# Patient Record
Sex: FEMALE
[De-identification: names, ages, dates, MRNs, and addresses within clinical notes are randomized per-mention and may not be internally consistent; named-entity substitution may affect disease eponyms.]

---

## 2017-01-25 PROCEDURE — 87252 VIRUS INOCULATION TISSUE: CPT | Performed by: INTERNAL MEDICINE

## 2017-01-25 PROCEDURE — 87081 CULTURE SCREEN ONLY: CPT | Performed by: INTERNAL MEDICINE

## 2017-12-12 ENCOUNTER — CHARTING TRANS (OUTPATIENT)
Dept: OTHER | Age: 32
End: 2017-12-12

## 2017-12-12 ENCOUNTER — LAB SERVICES (OUTPATIENT)
Dept: OTHER | Age: 32
End: 2017-12-12

## 2017-12-12 LAB — RAPID STREP GROUP A: NORMAL

## 2018-04-03 ENCOUNTER — TELEPHONE (OUTPATIENT)
Dept: OBGYN UNIT | Facility: HOSPITAL | Age: 33
End: 2018-04-03

## 2018-04-04 ENCOUNTER — TELEPHONE (OUTPATIENT)
Dept: OBGYN UNIT | Facility: HOSPITAL | Age: 33
End: 2018-04-04

## 2018-04-09 NOTE — H&P
[facility]    PATIENT'S NAME: Julio BROWN   ATTENDING PHYSICIAN: Brigida Morales M.D.    PATIENT ACCOUNT#:   [de-identified]    LOCATION:    MEDICAL RECORD #:   [id number]       YOB: 1985  ADMISSION DATE:       04/10/2018    HISTORY AND P , arthroscopic ACL repair in ,  in  and also in . Her first  was for breech and her second  she attempted , pushed for 2 hours, and was told she had a small pelvis.     MEDICATIONS:  She is currently on prenat 12:19:35  t: 04/09/2018 12:47:16  [facility] [id number]  /

## 2018-04-10 ENCOUNTER — HOSPITAL ENCOUNTER (INPATIENT)
Facility: HOSPITAL | Age: 33
LOS: 3 days | Discharge: HOME | End: 2018-04-13
Attending: OBSTETRICS & GYNECOLOGY | Admitting: OBSTETRICS & GYNECOLOGY
Payer: COMMERCIAL

## 2018-04-10 ENCOUNTER — ANESTHESIA (OUTPATIENT)
Dept: OBGYN UNIT | Facility: HOSPITAL | Age: 33
End: 2018-04-10

## 2018-04-10 ENCOUNTER — ANESTHESIA EVENT (OUTPATIENT)
Dept: OBGYN UNIT | Facility: HOSPITAL | Age: 33
End: 2018-04-10

## 2018-04-10 ENCOUNTER — SURGERY (OUTPATIENT)
Age: 33
End: 2018-04-10

## 2018-04-10 PROBLEM — Z34.90 PREGNANCY: Status: ACTIVE | Noted: 2018-04-10

## 2018-04-10 PROBLEM — Z34.90 PREGNANCY (HCC): Status: ACTIVE | Noted: 2018-04-10

## 2018-04-10 PROBLEM — Z98.891 STATUS POST CESAREAN SECTION: Status: ACTIVE | Noted: 2018-04-10

## 2018-04-10 PROCEDURE — 86850 RBC ANTIBODY SCREEN: CPT | Performed by: OBSTETRICS & GYNECOLOGY

## 2018-04-10 PROCEDURE — 81002 URINALYSIS NONAUTO W/O SCOPE: CPT

## 2018-04-10 PROCEDURE — 86900 BLOOD TYPING SEROLOGIC ABO: CPT | Performed by: OBSTETRICS & GYNECOLOGY

## 2018-04-10 PROCEDURE — 85025 COMPLETE CBC W/AUTO DIFF WBC: CPT | Performed by: OBSTETRICS & GYNECOLOGY

## 2018-04-10 PROCEDURE — 86901 BLOOD TYPING SEROLOGIC RH(D): CPT | Performed by: OBSTETRICS & GYNECOLOGY

## 2018-04-10 PROCEDURE — 86780 TREPONEMA PALLIDUM: CPT | Performed by: OBSTETRICS & GYNECOLOGY

## 2018-04-10 RX ORDER — ONDANSETRON 2 MG/ML
4 INJECTION INTRAMUSCULAR; INTRAVENOUS EVERY 6 HOURS PRN
Status: DISCONTINUED | OUTPATIENT
Start: 2018-04-10 | End: 2018-04-10

## 2018-04-10 RX ORDER — NALOXONE HYDROCHLORIDE 0.4 MG/ML
0.08 INJECTION, SOLUTION INTRAMUSCULAR; INTRAVENOUS; SUBCUTANEOUS
Status: DISCONTINUED | OUTPATIENT
Start: 2018-04-10 | End: 2018-04-10

## 2018-04-10 RX ORDER — ONDANSETRON 2 MG/ML
4 INJECTION INTRAMUSCULAR; INTRAVENOUS ONCE AS NEEDED
Status: DISCONTINUED | OUTPATIENT
Start: 2018-04-10 | End: 2018-04-10 | Stop reason: HOSPADM

## 2018-04-10 RX ORDER — SODIUM CHLORIDE 9 MG/ML
100 INJECTION, SOLUTION INTRAVENOUS ONCE
Status: DISCONTINUED | OUTPATIENT
Start: 2018-04-10 | End: 2018-04-10

## 2018-04-10 RX ORDER — MORPHINE SULFATE 4 MG/ML
2 INJECTION, SOLUTION INTRAMUSCULAR; INTRAVENOUS EVERY 5 MIN PRN
Status: DISCONTINUED | OUTPATIENT
Start: 2018-04-10 | End: 2018-04-10 | Stop reason: HOSPADM

## 2018-04-10 RX ORDER — BISACODYL 10 MG
10 SUPPOSITORY, RECTAL RECTAL
Status: DISCONTINUED | OUTPATIENT
Start: 2018-04-10 | End: 2018-04-13

## 2018-04-10 RX ORDER — MORPHINE SULFATE 0.5 MG/ML
0.2 INJECTION, SOLUTION EPIDURAL; INTRATHECAL; INTRAVENOUS ONCE
Status: DISCONTINUED | OUTPATIENT
Start: 2018-04-10 | End: 2018-04-10

## 2018-04-10 RX ORDER — DEXTROSE, SODIUM CHLORIDE, SODIUM LACTATE, POTASSIUM CHLORIDE, AND CALCIUM CHLORIDE 5; .6; .31; .03; .02 G/100ML; G/100ML; G/100ML; G/100ML; G/100ML
INJECTION, SOLUTION INTRAVENOUS CONTINUOUS
Status: DISCONTINUED | OUTPATIENT
Start: 2018-04-10 | End: 2018-04-13

## 2018-04-10 RX ORDER — NALBUPHINE HCL 10 MG/ML
2.5 AMPUL (ML) INJECTION EVERY 4 HOURS PRN
Status: DISCONTINUED | OUTPATIENT
Start: 2018-04-10 | End: 2018-04-10

## 2018-04-10 RX ORDER — GENTAMICIN SULFATE 40 MG/ML
5 INJECTION, SOLUTION INTRAMUSCULAR; INTRAVENOUS ONCE
Status: COMPLETED | OUTPATIENT
Start: 2018-04-10 | End: 2018-04-10

## 2018-04-10 RX ORDER — DIPHENHYDRAMINE HYDROCHLORIDE 50 MG/ML
25 INJECTION INTRAMUSCULAR; INTRAVENOUS ONCE AS NEEDED
Status: DISCONTINUED | OUTPATIENT
Start: 2018-04-10 | End: 2018-04-10 | Stop reason: HOSPADM

## 2018-04-10 RX ORDER — KETOROLAC TROMETHAMINE 30 MG/ML
30 INJECTION, SOLUTION INTRAMUSCULAR; INTRAVENOUS ONCE AS NEEDED
Status: DISCONTINUED | OUTPATIENT
Start: 2018-04-10 | End: 2018-04-10 | Stop reason: HOSPADM

## 2018-04-10 RX ORDER — ZOLPIDEM TARTRATE 5 MG/1
5 TABLET ORAL NIGHTLY PRN
Status: DISCONTINUED | OUTPATIENT
Start: 2018-04-10 | End: 2018-04-13

## 2018-04-10 RX ORDER — HYDROCODONE BITARTRATE AND ACETAMINOPHEN 10; 325 MG/1; MG/1
1 TABLET ORAL EVERY 4 HOURS PRN
Status: DISCONTINUED | OUTPATIENT
Start: 2018-04-10 | End: 2018-04-13

## 2018-04-10 RX ORDER — NALBUPHINE HCL 10 MG/ML
2.5 AMPUL (ML) INJECTION
Status: DISCONTINUED | OUTPATIENT
Start: 2018-04-10 | End: 2018-04-10 | Stop reason: HOSPADM

## 2018-04-10 RX ORDER — HYDROCODONE BITARTRATE AND ACETAMINOPHEN 5; 325 MG/1; MG/1
1 TABLET ORAL EVERY 4 HOURS PRN
Status: DISCONTINUED | OUTPATIENT
Start: 2018-04-10 | End: 2018-04-13

## 2018-04-10 RX ORDER — DIPHENHYDRAMINE HYDROCHLORIDE 50 MG/ML
12.5 INJECTION INTRAMUSCULAR; INTRAVENOUS EVERY 4 HOURS PRN
Status: DISCONTINUED | OUTPATIENT
Start: 2018-04-10 | End: 2018-04-10

## 2018-04-10 RX ORDER — NALOXONE HYDROCHLORIDE 0.4 MG/ML
0.08 INJECTION, SOLUTION INTRAMUSCULAR; INTRAVENOUS; SUBCUTANEOUS
Status: ACTIVE | OUTPATIENT
Start: 2018-04-10 | End: 2018-04-11

## 2018-04-10 RX ORDER — DIPHENHYDRAMINE HYDROCHLORIDE 50 MG/ML
12.5 INJECTION INTRAMUSCULAR; INTRAVENOUS EVERY 4 HOURS PRN
Status: DISCONTINUED | OUTPATIENT
Start: 2018-04-10 | End: 2018-04-13

## 2018-04-10 RX ORDER — NALBUPHINE HCL 10 MG/ML
2.5 AMPUL (ML) INJECTION EVERY 4 HOURS PRN
Status: DISCONTINUED | OUTPATIENT
Start: 2018-04-10 | End: 2018-04-13

## 2018-04-10 RX ORDER — KETOROLAC TROMETHAMINE 30 MG/ML
INJECTION, SOLUTION INTRAMUSCULAR; INTRAVENOUS
Status: COMPLETED
Start: 2018-04-10 | End: 2018-04-10

## 2018-04-10 RX ORDER — MORPHINE SULFATE 4 MG/ML
2 INJECTION, SOLUTION INTRAMUSCULAR; INTRAVENOUS EVERY 2 HOUR PRN
Status: DISPENSED | OUTPATIENT
Start: 2018-04-10 | End: 2018-04-11

## 2018-04-10 RX ORDER — CLINDAMYCIN PHOSPHATE 900 MG/50ML
900 INJECTION INTRAVENOUS ONCE
Status: COMPLETED | OUTPATIENT
Start: 2018-04-10 | End: 2018-04-10

## 2018-04-10 RX ORDER — DIPHENHYDRAMINE HCL 25 MG
25 CAPSULE ORAL EVERY 4 HOURS PRN
Status: DISCONTINUED | OUTPATIENT
Start: 2018-04-10 | End: 2018-04-13

## 2018-04-10 RX ORDER — TRISODIUM CITRATE DIHYDRATE AND CITRIC ACID MONOHYDRATE 500; 334 MG/5ML; MG/5ML
30 SOLUTION ORAL ONCE
Status: COMPLETED | OUTPATIENT
Start: 2018-04-10 | End: 2018-04-10

## 2018-04-10 RX ORDER — SIMETHICONE 80 MG
80 TABLET,CHEWABLE ORAL 3 TIMES DAILY PRN
Status: DISCONTINUED | OUTPATIENT
Start: 2018-04-10 | End: 2018-04-13

## 2018-04-10 RX ORDER — IBUPROFEN 600 MG/1
600 TABLET ORAL EVERY 6 HOURS SCHEDULED
Status: DISCONTINUED | OUTPATIENT
Start: 2018-04-11 | End: 2018-04-13

## 2018-04-10 RX ORDER — SODIUM CHLORIDE, SODIUM LACTATE, POTASSIUM CHLORIDE, CALCIUM CHLORIDE 600; 310; 30; 20 MG/100ML; MG/100ML; MG/100ML; MG/100ML
INJECTION, SOLUTION INTRAVENOUS CONTINUOUS
Status: DISCONTINUED | OUTPATIENT
Start: 2018-04-10 | End: 2018-04-13

## 2018-04-10 RX ORDER — DIPHENHYDRAMINE HCL 25 MG
25 CAPSULE ORAL EVERY 4 HOURS PRN
Status: DISCONTINUED | OUTPATIENT
Start: 2018-04-10 | End: 2018-04-10

## 2018-04-10 RX ORDER — MORPHINE SULFATE 0.5 MG/ML
0.2 INJECTION, SOLUTION EPIDURAL; INTRATHECAL; INTRAVENOUS ONCE
Status: DISCONTINUED | OUTPATIENT
Start: 2018-04-10 | End: 2018-04-13

## 2018-04-10 RX ORDER — METOCLOPRAMIDE HYDROCHLORIDE 5 MG/ML
10 INJECTION INTRAMUSCULAR; INTRAVENOUS EVERY 6 HOURS PRN
Status: DISCONTINUED | OUTPATIENT
Start: 2018-04-10 | End: 2018-04-13

## 2018-04-10 RX ORDER — KETOROLAC TROMETHAMINE 30 MG/ML
30 INJECTION, SOLUTION INTRAMUSCULAR; INTRAVENOUS EVERY 6 HOURS PRN
Status: DISPENSED | OUTPATIENT
Start: 2018-04-10 | End: 2018-04-11

## 2018-04-10 RX ORDER — ONDANSETRON 2 MG/ML
4 INJECTION INTRAMUSCULAR; INTRAVENOUS EVERY 6 HOURS PRN
Status: DISCONTINUED | OUTPATIENT
Start: 2018-04-10 | End: 2018-04-13

## 2018-04-10 RX ORDER — DOCUSATE SODIUM 100 MG/1
100 CAPSULE, LIQUID FILLED ORAL
Status: DISCONTINUED | OUTPATIENT
Start: 2018-04-11 | End: 2018-04-13

## 2018-04-10 NOTE — BRIEF OP NOTE
Pre-Operative Diagnosis: 39.0 IUP Repeat  Section     Post-Operative Diagnosis: Same      Procedure Performed: ltcs  Procedure(s):      Surgeon(s) and Role:     * Evelio Gilman MD, MD - Primary     * Cathy Burrell MD - Assisting Surgeon    As

## 2018-04-10 NOTE — PROGRESS NOTES
Pt is a 28year old female [facility]. Patient presents with:  Scheduled      Pt is [id number] 39w0d intra-uterine pregnancy. History obtained, consents signed. Oriented to room, staff, and plan of care.

## 2018-04-10 NOTE — ANESTHESIA PREPROCEDURE EVALUATION
PRE-OP EVALUATION    Patient Name: Pilar Mandujano    Pre-op Diagnosis: * No pre-op diagnosis entered *    Procedure(s):      Surgeon(s) and Role:     Steven Martinez MD, MD - Primary    Pre-op vitals reviewed.   Temp: 98.4 °F (36.9 °C)  Pulse: 92  Resp: RBC 4.36 04/10/2018   HGB 12.9 04/10/2018   HCT 36.4 04/10/2018   MCV 83.5 04/10/2018   MCH 29.6 04/10/2018   MCHC 35.4 04/10/2018   RDW 12.9 04/10/2018   .0 04/10/2018               Airway      Mallampati: II  Mouth opening: >3 FB  TM distance: >

## 2018-04-10 NOTE — OPERATIVE REPORT
[facility]    PATIENT'S NAME: Matty Holguin KEVIN   ATTENDING PHYSICIAN: Purnima Casas M.D. OPERATING PHYSICIAN: Purnima Casas M.D.    PATIENT ACCOUNT#:   [de-identified]    LOCATION:  [address] [place] [address]  MEDICAL RECORD #:   [id number]       DATE OF BIRTH:  [birth year] superiorly, and then the muscle was opened in the midline with sharp dissection and then blunt dissection. The perineum was then also entered and a bladder flap was created using sharp dissection. There were minor adhesions from her previous surgery.   A cauterized. Then, a 2-0 plain suture was used to reapproximate that subcutaneous space and the skin was closed with staples. Estimated blood loss 800 mL. IV fluids in 2400 mL. Urine output 325 mL. All sponge, lap, and instrument counts were correct.

## 2018-04-10 NOTE — ANESTHESIA POSTPROCEDURE EVALUATION
[address] Patient Status:  Inpatient   Age/Gender 28year old female MRN [id number]   Location 1818 [facility] Attending Jose Suarez MD, MD   Hosp Day # 0 PCP No primary care provider on file.        Anesthesia P

## 2018-04-10 NOTE — PLAN OF CARE
BIRTH - VAGINAL/ SECTION    • Fetal and maternal status remain reassuring during the birth process Completed        Patient/Family Goals    • Patient/Family Long Term Goal Completed    • Patient/Family Short Term Goal Completed

## 2018-04-11 PROCEDURE — 85025 COMPLETE CBC W/AUTO DIFF WBC: CPT | Performed by: OBSTETRICS & GYNECOLOGY

## 2018-04-11 NOTE — PLAN OF CARE
GENITOURINARY - ADULT    • Absence of urinary retention Completed          GASTROINTESTINAL - ADULT    • Minimal or absence of nausea and vomiting Progressing    • Maintains or returns to baseline bowel function Progressing        POSTPARTUM    • Long Term

## 2018-04-11 NOTE — PROGRESS NOTES
Anesthesiology         Pt is POD#1 s/p C/S with neruaxial Duramoprh for postop analgesia. Pt denies any HA, backache, N/V or pruritis. Pt states that pain is well controlled. No evidence of anesthetic related complications.          Thank you for brittany

## 2018-04-11 NOTE — PROGRESS NOTES
Patient resting quietly. Holding infant. Urinated. Toerated gen diet. Ambulated. Pain relieved with pain meds.      04/11/18  0749   BP: 107/64   Pulse: 96   Resp: 20   Temp: 98.6 °F (37 °C)     I/O = -900      Lab Results  Component Value Date   WBC 9.9 04

## 2018-04-11 NOTE — PLAN OF CARE
ANXIETY    • Will report anxiety at manageable levels Completed        PAIN - ADULT    • Verbalizes/displays adequate comfort level or patient's stated pain goal Completed        SAFETY ADULT - FALL    • Free from fall injury Completed          GASTROINTES

## 2018-04-12 NOTE — PROGRESS NOTES
POD 2    s: doing well, bleeding ok, pain well managed with po meds, breastfeeding  O: /52 (BP Location: Right arm)   Pulse 81   Temp 98.5 °F (36.9 °C) (Oral)   Resp 16   Ht 5' 4\" (1.626 m)   Wt 200 lb (90.7 kg)   LMP 07/11/2017   SpO2 100%   Breast

## 2018-04-13 VITALS
HEART RATE: 76 BPM | SYSTOLIC BLOOD PRESSURE: 112 MMHG | RESPIRATION RATE: 18 BRPM | WEIGHT: 200 LBS | OXYGEN SATURATION: 100 % | BODY MASS INDEX: 34.15 KG/M2 | HEIGHT: 64 IN | TEMPERATURE: 98 F | DIASTOLIC BLOOD PRESSURE: 69 MMHG

## 2018-04-13 RX ORDER — IBUPROFEN 600 MG/1
600 TABLET ORAL EVERY 6 HOURS SCHEDULED
Qty: 40 TABLET | Refills: 1 | Status: ON HOLD | OUTPATIENT
Start: 2018-04-13 | End: 2021-11-09

## 2018-04-13 RX ORDER — HYDROCODONE BITARTRATE AND ACETAMINOPHEN 5; 325 MG/1; MG/1
1 TABLET ORAL EVERY 6 HOURS PRN
Qty: 30 TABLET | Refills: 0 | Status: ON HOLD | OUTPATIENT
Start: 2018-04-13 | End: 2021-11-09

## 2018-04-13 NOTE — PLAN OF CARE
GASTROINTESTINAL - ADULT    • Maintains or returns to baseline bowel function Completed        POSTPARTUM    • Long Term Goal:Experiences normal postpartum course Completed    • Optimize infant feeding at the breast Completed    • Appropriate maternal - ne

## 2018-04-13 NOTE — DISCHARGE SUMMARY
[facility]  Discharge Summary    [facility] Patient Status:  Inpatient    1985 MRN [id number]   [facility] 1SW-J Attending Leia Carrillo MD, MD   Hosp Day # 3 PCP No primary care provider on file.      Date of Admission:

## 2018-04-13 NOTE — PROGRESS NOTES
Discharge instructions given to mom. Mom stated undestanding with regards to self care.  Baby care and follow-up  appointments

## 2018-04-13 NOTE — PROGRESS NOTES
Postop Day 3    Pt without complaints. Pain under control with meds, + flatus, +BM, Minimal VB    Temp: 98.3 °F (36.8 °C)  Pulse: 76  Resp: 18  BP: 112/69  Abdomen -  Soft, NT, ND, fundus firm below umbilicus, incision C/D/I.  Staples +  Extremities - Trace

## 2018-04-17 ENCOUNTER — TELEPHONE (OUTPATIENT)
Dept: OBGYN UNIT | Facility: HOSPITAL | Age: 33
End: 2018-04-17

## 2018-11-02 VITALS
DIASTOLIC BLOOD PRESSURE: 68 MMHG | OXYGEN SATURATION: 98 % | RESPIRATION RATE: 18 BRPM | HEIGHT: 64 IN | WEIGHT: 175 LBS | SYSTOLIC BLOOD PRESSURE: 110 MMHG | HEART RATE: 72 BPM | TEMPERATURE: 98.2 F | BODY MASS INDEX: 29.88 KG/M2

## 2021-07-02 NOTE — PROGRESS NOTES
[facility] Consultation    Dear Dr. Sabina Barger,    Thank you for requesting ultrasound evaluation and maternal fetal medicine consultation on your patient Danielle Osuna.   As you are aware she is a 39year old female with a [place] Speak She indicated that the status of her maternal aunt is unknown. She indicated that the status of her paternal aunt is unknown.       Medications:   Current Outpatient Medications:   •  Cholecalciferol (VITAMIN D) 50 MCG (2000 UT) Oral Tab, Take by mouth., Zulma Mason during pregnancy is associated with numerous maternal and  risks.   It is not clear whether obesity is a direct cause of adverse pregnancy outcome or whether the association between obesity and adverse pregnancy outcome is due to factors such as di advised. We discussed the current recommendations for limited gestational weight gain in pregnancy for overweight and obese women. The [place] of Medicine currently recommends that women keep gestational weight gain to between 8-18 lbs.   We discussed placentation. The risk of placenta previa after 1 and after ? 3  deliveries was 10/1000 and , respectively.  The risk of placenta accreta in women with placenta previa and no  deliveries was 3 to 4 percent and was and 50 to 67 percent older can expect to experience two to three fold higher rates of hospitalization,  delivery, and pregnancy-related complications when compared to their younger counterparts.   The two most common medical problems complicating these  pregnanccies are offspring of older women. These abnormalities are structural and unrelated to aneuploidy, thus they would not be detected by karyotype analysis.   For these reasons a complete, detailed ultrasound (level II) is advised even if the fetus has a normal Gabi pregnancy  · Advanced maternal age  · History of  x 3    RECOMMENDATIONS:  · Continue care with Dr. Mar Singh  · Weekly NST at 36 weeks  · Growth and BPP ultrasound at 32 weeks    Thank you for allowing me to participate in the care of your patient

## 2021-07-07 ENCOUNTER — OFFICE VISIT (OUTPATIENT)
Dept: PERINATAL CARE | Facility: HOSPITAL | Age: 36
End: 2021-07-07
Attending: OBSTETRICS & GYNECOLOGY
Payer: COMMERCIAL

## 2021-07-07 VITALS
SYSTOLIC BLOOD PRESSURE: 112 MMHG | HEART RATE: 73 BPM | BODY MASS INDEX: 34 KG/M2 | WEIGHT: 200 LBS | DIASTOLIC BLOOD PRESSURE: 75 MMHG

## 2021-07-07 DIAGNOSIS — O99.210 MATERNAL OBESITY AFFECTING PREGNANCY, ANTEPARTUM: ICD-10-CM

## 2021-07-07 DIAGNOSIS — O09.522 MULTIGRAVIDA OF ADVANCED MATERNAL AGE IN SECOND TRIMESTER: ICD-10-CM

## 2021-07-07 DIAGNOSIS — O09.529 AMA (ADVANCED MATERNAL AGE) MULTIGRAVIDA 35+: Primary | ICD-10-CM

## 2021-07-07 DIAGNOSIS — O09.529 AMA (ADVANCED MATERNAL AGE) MULTIGRAVIDA 35+: ICD-10-CM

## 2021-07-07 DIAGNOSIS — O09.529 ANTEPARTUM MULTIGRAVIDA OF ADVANCED MATERNAL AGE: ICD-10-CM

## 2021-07-07 DIAGNOSIS — Z98.891 H/O: C-SECTION: ICD-10-CM

## 2021-07-07 PROCEDURE — 76811 OB US DETAILED SNGL FETUS: CPT | Performed by: OBSTETRICS & GYNECOLOGY

## 2021-07-07 PROCEDURE — 76817 TRANSVAGINAL US OBSTETRIC: CPT | Performed by: OBSTETRICS & GYNECOLOGY

## 2021-07-07 PROCEDURE — 76817 TRANSVAGINAL US OBSTETRIC: CPT

## 2021-07-07 PROCEDURE — 99243 OFF/OP CNSLTJ NEW/EST LOW 30: CPT | Performed by: OBSTETRICS & GYNECOLOGY

## 2021-07-07 RX ORDER — CHOLECALCIFEROL (VITAMIN D3) 50 MCG
TABLET ORAL
COMMUNITY

## 2021-11-08 ENCOUNTER — TELEPHONE (OUTPATIENT)
Dept: OBGYN UNIT | Facility: HOSPITAL | Age: 36
End: 2021-11-08

## 2021-11-08 RX ORDER — FERROUS SULFATE TAB EC 324 MG (65 MG FE EQUIVALENT) 324 (65 FE) MG
TABLET DELAYED RESPONSE ORAL
COMMUNITY
End: 2021-11-12

## 2021-11-09 ENCOUNTER — ANESTHESIA EVENT (OUTPATIENT)
Dept: OBGYN UNIT | Facility: HOSPITAL | Age: 36
End: 2021-11-09
Payer: COMMERCIAL

## 2021-11-09 ENCOUNTER — ANESTHESIA (OUTPATIENT)
Dept: OBGYN UNIT | Facility: HOSPITAL | Age: 36
End: 2021-11-09
Payer: COMMERCIAL

## 2021-11-09 ENCOUNTER — HOSPITAL ENCOUNTER (INPATIENT)
Facility: HOSPITAL | Age: 36
LOS: 3 days | Discharge: HOME | End: 2021-11-12
Attending: OBSTETRICS & GYNECOLOGY | Admitting: OBSTETRICS & GYNECOLOGY
Payer: COMMERCIAL

## 2021-11-09 PROCEDURE — 86780 TREPONEMA PALLIDUM: CPT | Performed by: OBSTETRICS & GYNECOLOGY

## 2021-11-09 PROCEDURE — 86901 BLOOD TYPING SEROLOGIC RH(D): CPT | Performed by: OBSTETRICS & GYNECOLOGY

## 2021-11-09 PROCEDURE — 0UT70ZZ RESECTION OF BILATERAL FALLOPIAN TUBES, OPEN APPROACH: ICD-10-PCS | Performed by: OBSTETRICS & GYNECOLOGY

## 2021-11-09 PROCEDURE — 88302 TISSUE EXAM BY PATHOLOGIST: CPT | Performed by: OBSTETRICS & GYNECOLOGY

## 2021-11-09 PROCEDURE — 86900 BLOOD TYPING SEROLOGIC ABO: CPT | Performed by: OBSTETRICS & GYNECOLOGY

## 2021-11-09 PROCEDURE — 86850 RBC ANTIBODY SCREEN: CPT | Performed by: OBSTETRICS & GYNECOLOGY

## 2021-11-09 PROCEDURE — 85025 COMPLETE CBC W/AUTO DIFF WBC: CPT | Performed by: OBSTETRICS & GYNECOLOGY

## 2021-11-09 PROCEDURE — 0UT20ZZ RESECTION OF BILATERAL OVARIES, OPEN APPROACH: ICD-10-PCS | Performed by: OBSTETRICS & GYNECOLOGY

## 2021-11-09 RX ORDER — METOCLOPRAMIDE HYDROCHLORIDE 5 MG/ML
10 INJECTION INTRAMUSCULAR; INTRAVENOUS EVERY 6 HOURS PRN
Status: DISCONTINUED | OUTPATIENT
Start: 2021-11-09 | End: 2021-11-10

## 2021-11-09 RX ORDER — ONDANSETRON 2 MG/ML
4 INJECTION INTRAMUSCULAR; INTRAVENOUS ONCE AS NEEDED
Status: DISCONTINUED | OUTPATIENT
Start: 2021-11-09 | End: 2021-11-09 | Stop reason: HOSPADM

## 2021-11-09 RX ORDER — SIMETHICONE 80 MG
80 TABLET,CHEWABLE ORAL 3 TIMES DAILY PRN
Status: DISCONTINUED | OUTPATIENT
Start: 2021-11-09 | End: 2021-11-12

## 2021-11-09 RX ORDER — SODIUM CHLORIDE, SODIUM LACTATE, POTASSIUM CHLORIDE, CALCIUM CHLORIDE 600; 310; 30; 20 MG/100ML; MG/100ML; MG/100ML; MG/100ML
125 INJECTION, SOLUTION INTRAVENOUS CONTINUOUS
Status: DISCONTINUED | OUTPATIENT
Start: 2021-11-09 | End: 2021-11-09 | Stop reason: HOSPADM

## 2021-11-09 RX ORDER — MORPHINE SULFATE 2 MG/ML
INJECTION, SOLUTION INTRAMUSCULAR; INTRAVENOUS AS NEEDED
Status: DISCONTINUED | OUTPATIENT
Start: 2021-11-09 | End: 2021-11-09 | Stop reason: SURG

## 2021-11-09 RX ORDER — NALBUPHINE HCL 10 MG/ML
2.5 AMPUL (ML) INJECTION EVERY 4 HOURS PRN
Status: DISCONTINUED | OUTPATIENT
Start: 2021-11-09 | End: 2021-11-10

## 2021-11-09 RX ORDER — PHENYLEPHRINE HCL 10 MG/ML
VIAL (ML) INJECTION AS NEEDED
Status: DISCONTINUED | OUTPATIENT
Start: 2021-11-09 | End: 2021-11-09 | Stop reason: SURG

## 2021-11-09 RX ORDER — NALBUPHINE HCL 10 MG/ML
2.5 AMPUL (ML) INJECTION
Status: DISCONTINUED | OUTPATIENT
Start: 2021-11-09 | End: 2021-11-12

## 2021-11-09 RX ORDER — TRISODIUM CITRATE DIHYDRATE AND CITRIC ACID MONOHYDRATE 500; 334 MG/5ML; MG/5ML
30 SOLUTION ORAL ONCE
Status: CANCELLED | OUTPATIENT
Start: 2021-11-09 | End: 2021-11-09

## 2021-11-09 RX ORDER — SODIUM CHLORIDE, SODIUM LACTATE, POTASSIUM CHLORIDE, CALCIUM CHLORIDE 600; 310; 30; 20 MG/100ML; MG/100ML; MG/100ML; MG/100ML
INJECTION, SOLUTION INTRAVENOUS CONTINUOUS
Status: DISCONTINUED | OUTPATIENT
Start: 2021-11-09 | End: 2021-11-10

## 2021-11-09 RX ORDER — GABAPENTIN 300 MG/1
300 CAPSULE ORAL 3 TIMES DAILY PRN
Status: DISCONTINUED | OUTPATIENT
Start: 2021-11-09 | End: 2021-11-12

## 2021-11-09 RX ORDER — DIPHENHYDRAMINE HYDROCHLORIDE 50 MG/ML
25 INJECTION INTRAMUSCULAR; INTRAVENOUS ONCE AS NEEDED
Status: DISCONTINUED | OUTPATIENT
Start: 2021-11-09 | End: 2021-11-09 | Stop reason: HOSPADM

## 2021-11-09 RX ORDER — KETOROLAC TROMETHAMINE 30 MG/ML
30 INJECTION, SOLUTION INTRAMUSCULAR; INTRAVENOUS EVERY 6 HOURS
Status: DISCONTINUED | OUTPATIENT
Start: 2021-11-09 | End: 2021-11-10

## 2021-11-09 RX ORDER — CEFAZOLIN SODIUM/WATER 2 G/20 ML
2 SYRINGE (ML) INTRAVENOUS ONCE
Status: CANCELLED | OUTPATIENT
Start: 2021-11-09 | End: 2021-11-09

## 2021-11-09 RX ORDER — CEFAZOLIN SODIUM/WATER 2 G/20 ML
2 SYRINGE (ML) INTRAVENOUS ONCE
Status: DISCONTINUED | OUTPATIENT
Start: 2021-11-09 | End: 2021-11-09 | Stop reason: HOSPADM

## 2021-11-09 RX ORDER — ACETAMINOPHEN 500 MG
1000 TABLET ORAL EVERY 6 HOURS
Status: DISCONTINUED | OUTPATIENT
Start: 2021-11-09 | End: 2021-11-12

## 2021-11-09 RX ORDER — NALOXONE HYDROCHLORIDE 0.4 MG/ML
0.08 INJECTION, SOLUTION INTRAMUSCULAR; INTRAVENOUS; SUBCUTANEOUS
Status: DISCONTINUED | OUTPATIENT
Start: 2021-11-09 | End: 2021-11-10

## 2021-11-09 RX ORDER — ONDANSETRON 2 MG/ML
4 INJECTION INTRAMUSCULAR; INTRAVENOUS EVERY 6 HOURS PRN
Status: DISCONTINUED | OUTPATIENT
Start: 2021-11-09 | End: 2021-11-09

## 2021-11-09 RX ORDER — SODIUM CHLORIDE, SODIUM LACTATE, POTASSIUM CHLORIDE, CALCIUM CHLORIDE 600; 310; 30; 20 MG/100ML; MG/100ML; MG/100ML; MG/100ML
125 INJECTION, SOLUTION INTRAVENOUS CONTINUOUS
Status: CANCELLED | OUTPATIENT
Start: 2021-11-09

## 2021-11-09 RX ORDER — IBUPROFEN 600 MG/1
600 TABLET ORAL EVERY 6 HOURS
Status: DISCONTINUED | OUTPATIENT
Start: 2021-11-10 | End: 2021-11-12

## 2021-11-09 RX ORDER — DOCUSATE SODIUM 100 MG/1
100 CAPSULE, LIQUID FILLED ORAL
Status: DISCONTINUED | OUTPATIENT
Start: 2021-11-09 | End: 2021-11-12

## 2021-11-09 RX ORDER — BUPIVACAINE HYDROCHLORIDE 7.5 MG/ML
INJECTION, SOLUTION INTRASPINAL AS NEEDED
Status: DISCONTINUED | OUTPATIENT
Start: 2021-11-09 | End: 2021-11-09 | Stop reason: SURG

## 2021-11-09 RX ORDER — TRISODIUM CITRATE DIHYDRATE AND CITRIC ACID MONOHYDRATE 500; 334 MG/5ML; MG/5ML
30 SOLUTION ORAL ONCE
Status: COMPLETED | OUTPATIENT
Start: 2021-11-09 | End: 2021-11-09

## 2021-11-09 RX ORDER — ACETAMINOPHEN 500 MG
1000 TABLET ORAL ONCE
Status: CANCELLED | OUTPATIENT
Start: 2021-11-09 | End: 2021-11-09

## 2021-11-09 RX ORDER — HYDROMORPHONE HYDROCHLORIDE 1 MG/ML
0.4 INJECTION, SOLUTION INTRAMUSCULAR; INTRAVENOUS; SUBCUTANEOUS EVERY 5 MIN PRN
Status: DISCONTINUED | OUTPATIENT
Start: 2021-11-09 | End: 2021-11-09 | Stop reason: HOSPADM

## 2021-11-09 RX ORDER — KETOROLAC TROMETHAMINE 30 MG/ML
30 INJECTION, SOLUTION INTRAMUSCULAR; INTRAVENOUS ONCE AS NEEDED
Status: COMPLETED | OUTPATIENT
Start: 2021-11-09 | End: 2021-11-09

## 2021-11-09 RX ORDER — ONDANSETRON 2 MG/ML
4 INJECTION INTRAMUSCULAR; INTRAVENOUS EVERY 6 HOURS PRN
Status: CANCELLED | OUTPATIENT
Start: 2021-11-09

## 2021-11-09 RX ORDER — HYDROMORPHONE HYDROCHLORIDE 1 MG/ML
0.4 INJECTION, SOLUTION INTRAMUSCULAR; INTRAVENOUS; SUBCUTANEOUS EVERY 2 HOUR PRN
Status: DISCONTINUED | OUTPATIENT
Start: 2021-11-09 | End: 2021-11-10

## 2021-11-09 RX ORDER — ONDANSETRON 2 MG/ML
4 INJECTION INTRAMUSCULAR; INTRAVENOUS EVERY 6 HOURS PRN
Status: DISCONTINUED | OUTPATIENT
Start: 2021-11-09 | End: 2021-11-10

## 2021-11-09 RX ORDER — BISACODYL 10 MG
10 SUPPOSITORY, RECTAL RECTAL ONCE AS NEEDED
Status: DISCONTINUED | OUTPATIENT
Start: 2021-11-09 | End: 2021-11-12

## 2021-11-09 RX ORDER — DEXAMETHASONE SODIUM PHOSPHATE 4 MG/ML
VIAL (ML) INJECTION AS NEEDED
Status: DISCONTINUED | OUTPATIENT
Start: 2021-11-09 | End: 2021-11-09 | Stop reason: SURG

## 2021-11-09 RX ORDER — DIPHENHYDRAMINE HCL 25 MG
25 CAPSULE ORAL EVERY 4 HOURS PRN
Status: DISCONTINUED | OUTPATIENT
Start: 2021-11-09 | End: 2021-11-10

## 2021-11-09 RX ORDER — ACETAMINOPHEN 500 MG
1000 TABLET ORAL ONCE
Status: COMPLETED | OUTPATIENT
Start: 2021-11-09 | End: 2021-11-09

## 2021-11-09 RX ORDER — DEXTROSE, SODIUM CHLORIDE, SODIUM LACTATE, POTASSIUM CHLORIDE, AND CALCIUM CHLORIDE 5; .6; .31; .03; .02 G/100ML; G/100ML; G/100ML; G/100ML; G/100ML
INJECTION, SOLUTION INTRAVENOUS CONTINUOUS PRN
Status: DISCONTINUED | OUTPATIENT
Start: 2021-11-09 | End: 2021-11-12

## 2021-11-09 RX ORDER — DIPHENHYDRAMINE HYDROCHLORIDE 50 MG/ML
12.5 INJECTION INTRAMUSCULAR; INTRAVENOUS EVERY 4 HOURS PRN
Status: DISCONTINUED | OUTPATIENT
Start: 2021-11-09 | End: 2021-11-10

## 2021-11-09 RX ADMIN — SODIUM CHLORIDE, SODIUM LACTATE, POTASSIUM CHLORIDE, CALCIUM CHLORIDE: 600; 310; 30; 20 INJECTION, SOLUTION INTRAVENOUS at 10:11:00

## 2021-11-09 RX ADMIN — ONDANSETRON 4 MG: 2 INJECTION INTRAMUSCULAR; INTRAVENOUS at 08:55:00

## 2021-11-09 RX ADMIN — MORPHINE SULFATE 0.2 MG: 2 INJECTION, SOLUTION INTRAMUSCULAR; INTRAVENOUS at 08:43:00

## 2021-11-09 RX ADMIN — PHENYLEPHRINE HCL 100 MCG: 10 MG/ML VIAL (ML) INJECTION at 08:51:00

## 2021-11-09 RX ADMIN — DEXAMETHASONE SODIUM PHOSPHATE 4 MG: 4 MG/ML VIAL (ML) INJECTION at 08:48:00

## 2021-11-09 RX ADMIN — PHENYLEPHRINE HCL 100 MCG: 10 MG/ML VIAL (ML) INJECTION at 08:57:00

## 2021-11-09 RX ADMIN — BUPIVACAINE HYDROCHLORIDE 1.5 ML: 7.5 INJECTION, SOLUTION INTRASPINAL at 08:43:00

## 2021-11-09 RX ADMIN — SODIUM CHLORIDE, SODIUM LACTATE, POTASSIUM CHLORIDE, CALCIUM CHLORIDE: 600; 310; 30; 20 INJECTION, SOLUTION INTRAVENOUS at 08:39:00

## 2021-11-09 NOTE — ANESTHESIA PROCEDURE NOTES
Spinal Block  Performed by: Bebeto Srivastava MD  Authorized by: Bebeto Srivastava MD       General Information and Staff    Start Time:  11/9/2021 8:42 AM  End Time:  11/9/2021 8:44 AM  Anesthesiologist:  Bebeto Srivastava MD  Performed by:   Anesthesio

## 2021-11-09 NOTE — H&P
[facility]    PATIENT'S NAME: Jayne Garcia   ATTENDING PHYSICIAN: Laila Peter M.D.    PATIENT ACCOUNT#:   [de-identified]    LOCATION:  [facility]  MEDICAL RECORD #:   [id number]       YOB: 1985  ADMISSION DATE:       11/09/ Clomid. She had thick meconium and chorioamnionitis. The baby's Apgars were 4 and 9. Infant did well and was delivered at [facility].  Documented low-transverse  section.   3.   On 2015, 39 weeks, 8 pound 1 ounce female, section on 11/09/2021. The procedure and risks of procedure were discussed in depth with the patient. Patient states she understands the procedure and risks of the procedure and consents to the above. Permanent sterilization discussed.   The patient stro

## 2021-11-09 NOTE — L&D DELIVERY NOTE
Vidal Guillermo [[id number]]    Labor Events     labor?: No   steroids?: None  Antibiotics received during labor?: Yes  Antibiotics (enter # doses in comment): other (Comment: ancef 2g and azithromyocin)  Rupture date/time: 2021 0903     R irritability No response Grimace Cry or active withdrawal    Muscle tone Limp Some flexion Active motion    Respiratory effort Absent Weak cry; hypoventilation Good, crying              1 Minute:  5 Minute:  10 Minute:  15 Minute:  20 Minute:    Skin color

## 2021-11-09 NOTE — BRIEF OP NOTE
[facility]  Post-Op  Procedure Note    [facility] Status:  [facility]    1985 MRN [id number]   Location 1818 [facility] Attending Jude Browinng MD   Hosp Day # 0 PCP No primary care provider on file.

## 2021-11-09 NOTE — PLAN OF CARE
Problem: SAFETY ADULT - FALL  Goal: Free from fall injury  Description: INTERVENTIONS:  - Assess pt frequently for physical needs  - Identify cognitive and physical deficits and behaviors that affect risk of falls.   - [facility] fall precautions as indica allow for food preferences  - Enhance eating environment  Outcome: Progressing  Goal: Achieves appropriate nutritional intake (bariatric)  Description: INTERVENTIONS:  - Monitor for over-consumption  - Identify factors contributing to increased intake, drea Discuss/demonstrate breast feeding aids (e.g., infant sling, nursing footstool/pillows, and breast pumps). - Encourage mother/other family members to express feelings/concerns, and actively listen.   - Educate father/SO about benefits of breast feeding and

## 2021-11-09 NOTE — PROGRESS NOTES
Pericare completed, gown changed, reported called into Arabella RUIZ RN. Transferred via cart along with belongings and  to room  in stable condition.

## 2021-11-09 NOTE — PROGRESS NOTES
Patient received from L&D via cart. Transferred to room  in MB. [facility] Patient orientated to room. Bed in low and locked position. Call light within reach. Patient instruction to not get out of bed.  Vu.

## 2021-11-09 NOTE — ANESTHESIA PREPROCEDURE EVALUATION
PRE-OP EVALUATION    Patient Name: Priti Ann    Admit Diagnosis: preg state   Pregnancy    Pre-op Diagnosis: * No pre-op diagnosis entered *     SECTION    Anesthesia Procedure:  SECTION (N/A )  PPTL (Bilateral )    Surgeon(s) and Rol GI/hepatic/renal ROS. Cardiovascular    Negative cardiovascular ROS.     Exercise tolerance: good     MET: >4                                           Endo/Other  Comment: PCOS             (+) anemia                   Pulmonary

## 2021-11-09 NOTE — PLAN OF CARE
Problem: SAFETY ADULT - FALL  Goal: Free from fall injury  Description: INTERVENTIONS:  - Assess pt frequently for physical needs  - Identify cognitive and physical deficits and behaviors that affect risk of falls.   - [place] fall precautions as indica Progressing     Problem: ANXIETY  Goal: Will report anxiety at manageable levels  Description: INTERVENTIONS:  - Administer medication as ordered  - Teach and rehearse alternative coping skills  - Provide emotional support with 1:1 interaction with staff

## 2021-11-09 NOTE — ANESTHESIA POSTPROCEDURE EVALUATION
[address] Patient Status:  Inpatient   Age/Gender 39year old female MRN [id number]   Location 1818 [facility] Attending Sherryle Fennel, MD   Hosp Day # 0 PCP No primary care provider on file.        Anesthesia Pos

## 2021-11-09 NOTE — PROGRESS NOTES
Pt is a 39year old female admitted to [facility]-A. Patient presents with:  Scheduled : repeat c/s x3 with bilateral tubal ligation     Pt is  39w3d intra-uterine pregnancy. History obtained, consents signed.  Oriented to room, staff, and

## 2021-11-09 NOTE — PLAN OF CARE
Problem: BIRTH - VAGINAL/ SECTION  Goal: Fetal and maternal status remain reassuring during the birth process  Description: INTERVENTIONS:  - Monitor vital signs  - Monitor fetal heart rate  - Monitor uterine activity  - Monitor labor progression Term Goal:     Interventions:   -   - See additional Care Plan goals for specific interventions  Outcome: Progressing  Note: Adequate pain control with delivery of infant  Interventions:  Pain assessment scores as ordered  Patient scores pain a \"3\" or le

## 2021-11-10 PROBLEM — Z34.90 PREGNANCY (HCC): Status: RESOLVED | Noted: 2018-04-10 | Resolved: 2021-11-10

## 2021-11-10 PROBLEM — Z34.90 PREGNANCY: Status: RESOLVED | Noted: 2018-04-10 | Resolved: 2021-11-10

## 2021-11-10 PROCEDURE — 85025 COMPLETE CBC W/AUTO DIFF WBC: CPT | Performed by: OBSTETRICS & GYNECOLOGY

## 2021-11-10 RX ORDER — OXYCODONE HYDROCHLORIDE 5 MG/1
5 TABLET ORAL EVERY 4 HOURS PRN
Status: DISCONTINUED | OUTPATIENT
Start: 2021-11-10 | End: 2021-11-12

## 2021-11-10 NOTE — PLAN OF CARE
Problem: SAFETY ADULT - FALL  Goal: Free from fall injury  Description: INTERVENTIONS:  - Assess pt frequently for physical needs  - Identify cognitive and physical deficits and behaviors that affect risk of falls.   - [place] fall precautions as indica allow for food preferences  - Enhance eating environment  Outcome: Progressing  Goal: Achieves appropriate nutritional intake (bariatric)  Description: INTERVENTIONS:  - Monitor for over-consumption  - Identify factors contributing to increased intake, drea Discuss/demonstrate breast feeding aids (e.g., infant sling, nursing footstool/pillows, and breast pumps). - Encourage mother/other family members to express feelings/concerns, and actively listen.   - Educate father/SO about benefits of breast feeding and

## 2021-11-10 NOTE — PROGRESS NOTES
[facility]  Post-Partum Caesarean Section Progress Note    Crissie Racer Patient Status:  Inpatient    1985 MRN [id number]   [facility] 2SW-J Attending Jah Lima MD   Hosp Day # 1 PCP No primary care provider on file.

## 2021-11-10 NOTE — OPERATIVE REPORT
[facility]    PATIENT'S NAME: Corey Mina   ATTENDING PHYSICIAN: Consuelo Gomez M.D. OPERATING PHYSICIAN: Consuelo Gomez M.D.    PATIENT ACCOUNT#:   [de-identified]    LOCATION:  [address] [place] [address]  MEDICAL RECORD #:   [id number]       DATE OF B the superior portion of the rectus fascia, and the rectus fascia was bluntly and sharply dissected in the midline. The parietal peritoneum was bluntly entered. Metzenbaum scissors was used to continue this incision laterally in both directions.   Bladder Vicryl starting on the patient's left and continuing to the midline and then on the patient's right and continuing to the midline. The subcutaneous tissue was copiously irrigated.   Then, 3-0 plain was used to reapproximate the subcutaneous tissue, 3-0 Mon

## 2021-11-10 NOTE — PROGRESS NOTES
Heber OTTO-GIANNA Greco Patient Status:  Inpatient   Age/Gender 39year old female MRN [id number]   [facility] Sridevi Tubbs, [facility] Day # 1 PCP No primary care provider on file.       Anesthesi

## 2021-11-11 NOTE — PLAN OF CARE
Problem: GASTROINTESTINAL - ADULT  Goal: Minimal or absence of nausea and vomiting  Description: INTERVENTIONS:  - Maintain adequate hydration with IV or PO as ordered and tolerated  - Nasogastric tube to low intermittent suction as ordered  - Evaluate e Provide ice/sitz baths for perineum discomfort. - Monitor healing of incision/episiotomy/laceration, and assess for signs and symptoms of infection and hematoma.   - Assess bladder function and monitor for bladder distention.  - Provide/instruct/assist wit instructions and assistance, as needed. - Encourage rooming-in and breast feeding on demand.  - Encourage skin-to-skin contact. - Provide LC support as needed. - Assess for and manage engorgement.   - Provide breast feeding education handouts and informa

## 2021-11-11 NOTE — PROGRESS NOTES
[facility]  Post-Partum Caesarean Section Progress Note    Evelio Arthur Patient Status:  Inpatient    1985 MRN [id number]   [facility] 2SW-J Attending Goldie Ring MD   Hosp Day # 2 PCP No primary care provider on file.

## 2021-11-12 VITALS
HEART RATE: 69 BPM | OXYGEN SATURATION: 100 % | RESPIRATION RATE: 18 BRPM | TEMPERATURE: 99 F | DIASTOLIC BLOOD PRESSURE: 86 MMHG | HEIGHT: 64 IN | BODY MASS INDEX: 35.17 KG/M2 | SYSTOLIC BLOOD PRESSURE: 121 MMHG | WEIGHT: 206 LBS

## 2021-11-12 PROBLEM — Z98.891 STATUS POST CESAREAN SECTION: Status: RESOLVED | Noted: 2018-04-10 | Resolved: 2021-11-12

## 2021-11-12 RX ORDER — GABAPENTIN 300 MG/1
300 CAPSULE ORAL 3 TIMES DAILY PRN
Qty: 12 CAPSULE | Refills: 0 | Status: SHIPPED | OUTPATIENT
Start: 2021-11-12 | End: 2021-11-16

## 2021-11-12 NOTE — PROGRESS NOTES
[facility]  Post-Partum Caesarean Section Progress Note    José Do Patient Status:  Inpatient    1985 MRN [id number]   [facility] 2SW-J Attending Jackie Dumont MD   Hosp Day # 3 PCP No primary care provider on file.

## 2021-11-14 ENCOUNTER — TELEPHONE (OUTPATIENT)
Dept: OBGYN UNIT | Facility: HOSPITAL | Age: 36
End: 2021-11-14

## 2021-11-14 NOTE — DISCHARGE SUMMARY
[facility]    PATIENT'S NAME: Shara Sweetie   ATTENDING PHYSICIAN: Cristiano Grimaldo M.D.    PATIENT ACCOUNT#:   [de-identified]    LOCATION:  [address] [place] [address]  MEDICAL RECORD #:   [id number]       YOB: 1985  ADMISSION DATE:       11/09/

## 2023-10-26 ENCOUNTER — IMMUNIZATION (OUTPATIENT)
Dept: FAMILY MEDICINE CLINIC | Facility: CLINIC | Age: 38
End: 2023-10-26

## 2023-10-26 DIAGNOSIS — Z23 FLU VACCINE NEED: Primary | ICD-10-CM

## 2023-10-26 PROCEDURE — 90471 IMMUNIZATION ADMIN: CPT | Performed by: NURSE PRACTITIONER

## 2023-10-26 PROCEDURE — 90686 IIV4 VACC NO PRSV 0.5 ML IM: CPT | Performed by: NURSE PRACTITIONER

## 2024-01-29 ENCOUNTER — HOSPITAL ENCOUNTER (OUTPATIENT)
Dept: MAMMOGRAPHY | Age: 39
Discharge: HOME | End: 2024-01-29
Payer: COMMERCIAL

## 2024-01-29 DIAGNOSIS — Z12.31 ENCOUNTER FOR SCREENING MAMMOGRAM FOR MALIGNANT NEOPLASM OF BREAST: ICD-10-CM

## 2024-01-29 PROCEDURE — 77067 SCR MAMMO BI INCL CAD: CPT | Performed by: OBSTETRICS & GYNECOLOGY

## 2024-01-29 PROCEDURE — 77063 BREAST TOMOSYNTHESIS BI: CPT | Performed by: OBSTETRICS & GYNECOLOGY

## 2024-07-01 ENCOUNTER — APPOINTMENT (OUTPATIENT)
Dept: GENERAL RADIOLOGY | Age: 39
End: 2024-07-01
Attending: NURSE PRACTITIONER
Payer: COMMERCIAL

## 2024-07-01 ENCOUNTER — HOSPITAL ENCOUNTER (OUTPATIENT)
Age: 39
Discharge: HOME | End: 2024-07-01
Payer: COMMERCIAL

## 2024-07-01 VITALS
OXYGEN SATURATION: 100 % | BODY MASS INDEX: 30.73 KG/M2 | HEART RATE: 67 BPM | SYSTOLIC BLOOD PRESSURE: 133 MMHG | HEIGHT: 64 IN | DIASTOLIC BLOOD PRESSURE: 76 MMHG | RESPIRATION RATE: 18 BRPM | WEIGHT: 180 LBS | TEMPERATURE: 98 F

## 2024-07-01 DIAGNOSIS — S92.512A CLOSED DISPLACED FRACTURE OF PROXIMAL PHALANX OF LESSER TOE OF LEFT FOOT, INITIAL ENCOUNTER: Primary | ICD-10-CM

## 2024-07-01 PROCEDURE — 29550 STRAPPING OF TOES: CPT | Performed by: NURSE PRACTITIONER

## 2024-07-01 PROCEDURE — L3260 AMBULATORY SURGICAL BOOT EAC: HCPCS | Performed by: NURSE PRACTITIONER

## 2024-07-01 PROCEDURE — 99203 OFFICE O/P NEW LOW 30 MIN: CPT | Performed by: NURSE PRACTITIONER

## 2024-07-01 PROCEDURE — 73660 X-RAY EXAM OF TOE(S): CPT | Performed by: NURSE PRACTITIONER

## 2024-07-01 NOTE — DISCHARGE INSTRUCTIONS
Use the lizette tape and postop shoe until you follow-up with podiatry.  Tylenol and Motrin for pain.  Ice and elevate the foot.

## 2024-07-01 NOTE — ED PROVIDER NOTES
Patient Seen in: [facility]      History     Chief Complaint   Patient presents with    Leg or Foot Injury     Entered by patient    Toe Injury     Stated Complaint: Leg or Foot Injury- left foot injury this am    Subjective:   HPI  39-year-old female with anemia, PCOS, and varicella presents complaining of left pinky toe pain after she stubbed her toe today.  She denies any other injuries.    Objective:   Past Medical History:    Anemia    Genitourinary disease    PCOS    Infertility, female    clomid    PCOS (polycystic ovarian syndrome)    Varicella              Past Surgical History:   Procedure Laterality Date    Appendectomy  2012    [facility]       12    Knee surgery Right 2011    Right ACL surgery     Other  2002    [facility]                 Social History     Socioeconomic History    Marital status:    Tobacco Use    Smoking status: Never     Passive exposure: Never    Smokeless tobacco: Never   Vaping Use    Vaping status: Never Used   Substance and Sexual Activity    Alcohol use: No     Alcohol/week: 0.0 standard drinks of alcohol     Comment: 1-2 weekly     Drug use: No    Sexual activity: Never     Comment: Post Partum     Social Determinants of Health     Transportation Needs: Low Risk  (2023)    [facility]    Transportation Needs     Do you have trouble getting transportation to medical appointments?: No    [facility]    Social Connections    [facility]    Housing Stability              Review of Systems   All other systems reviewed and are negative.      Positive for stated Chief Complaint: Leg or Foot Injury (Entered by patient) and Toe Injury    Other systems are as noted in HPI.  Constitutional and vital signs reviewed.      All other systems reviewed  and negative except as noted above.    Physical Exam     ED Triage Vitals [07/01/24 1301]   /76   Pulse 67   Resp 18   Temp 97.6 °F (36.4 °C)   Temp src Temporal   SpO2 100 %   O2 Device None (Room air)       Current Vitals:   Vital Signs  BP: 133/76  Pulse: 67  Resp: 18  Temp: 97.6 °F (36.4 °C)  Temp src: Temporal    Oxygen Therapy  SpO2: 100 %  O2 Device: None (Room air)            Physical Exam  Vitals and nursing note reviewed.   Constitutional:       General: She is not in acute distress.     Appearance: She is well-developed. She is not ill-appearing or toxic-appearing.   Cardiovascular:      Rate and Rhythm: Normal rate.   Pulmonary:      Effort: Pulmonary effort is normal.   Musculoskeletal:      Comments: Tenderness to the left fifth toe with swelling and bruising.  No metatarsal tenderness.  DP 3+.   Skin:     General: Skin is warm and dry.   Neurological:      Mental Status: She is alert and oriented to person, place, and time.               ED Course   Labs Reviewed - No data to display          XR TOE(S) (MIN 2 VIEWS), LEFT 5TH (CPT=73660)    Result Date: 7/1/2024  PROCEDURE:  XR TOE(S) (MIN 2 VIEWS), LEFT 5TH (CPT=73660)  TECHNIQUE:  Three views were obtained.  COMPARISON:  None.  INDICATIONS:  stubbed toe with pain, 5th toe  PATIENT STATED HISTORY: (As transcribed by Technologist)  Left 5th toe injury; pain.    FINDINGS:  Acute mildly displaced oblique fracture through the proximal/mid aspect of the left 5th proximal phalanx.  There is associated soft tissue swelling.            CONCLUSION:  See above.   LOCATION:  [facility]   Dictated by (CST): Stromberg, LeRoy, MD on 7/01/2024 at 2:18 PM     Finalized by (CST): Stromberg, LeRoy, MD on 7/01/2024 at 2:19 PM               [facility]       Medical Decision Making  39-year-old female with anemia, PCOS, and varicella presents complaining of left pinky toe pain after she stubbed her toe today.  She denies any other injuries.    Pertinent Imaging studies  reviewed. (See chart for details).  Patient coming in with left fifth toe pain.   Differential diagnosis includes but not limited to toe pain, toe fracture, toe sprain  Radiology Acute mildly displaced oblique fracture through the proximal/mid aspect of the left 5th proximal phalanx.  There is associated soft tissue swelling.            Will treat for fracture.  Will discharge on lizette tape with postop shoe with follow-up with podiatry.  Ice, elevation, and Tylenol/Motrin for pain.  Patient is comfortable with this plan.    Overall Pt looks good. Non-toxic, well-hydrated and in no respiratory distress. Vital signs are reassuring. Exam is reassuring. I do not believe pt requires and additional diagnostic studies or intervention. I believe pt can be discharged home to continue evaluation as an outpatient. Follow-up provider given. Discharge instructions given and reviewed. Return for any problems. All understand and agree with the plan.    Please note that this report has been produced using speech recognition software and may contain errors related to that system including, but not limited to, errors in grammar, punctuation, and spelling, as well as words and phrases that possibly may have been recognized inappropriately. If there are any questions or concerns, contact the dictating provider for clarification.    Problems Addressed:  Closed displaced fracture of proximal phalanx of lesser toe of left foot, initial encounter: acute illness or injury    Amount and/or Complexity of Data Reviewed  Radiology: ordered and independent interpretation performed.     Details: Toe x-ray ordered and independently interpreted by myself as a proximal fifth phalanx fracture        Disposition and Plan     Clinical Impression:  1. Closed displaced fracture of proximal phalanx of lesser toe of left foot, initial encounter         Disposition:  Discharge  7/1/2024  1:25 pm    Follow-up:  Jorje Piña DPM  [address] [facility]  [address]  [place] [address]  [phone]    Call             Medications Prescribed:  Discharge Medication List as of 7/1/2024  1:35 PM

## 2025-04-30 ENCOUNTER — HOSPITAL ENCOUNTER (OUTPATIENT)
Dept: MAMMOGRAPHY | Facility: HOSPITAL | Age: 40
Discharge: HOME | End: 2025-04-30
Attending: OBSTETRICS & GYNECOLOGY
Payer: COMMERCIAL

## 2025-04-30 DIAGNOSIS — N63.10 BILATERAL BREAST LUMP: ICD-10-CM

## 2025-04-30 DIAGNOSIS — N63.20 BILATERAL BREAST LUMP: ICD-10-CM

## 2025-04-30 PROCEDURE — 77062 BREAST TOMOSYNTHESIS BI: CPT | Performed by: OBSTETRICS & GYNECOLOGY

## 2025-04-30 PROCEDURE — 76642 ULTRASOUND BREAST LIMITED: CPT | Performed by: OBSTETRICS & GYNECOLOGY

## 2025-04-30 PROCEDURE — 77066 DX MAMMO INCL CAD BI: CPT | Performed by: OBSTETRICS & GYNECOLOGY

## 2025-06-27 ENCOUNTER — TELEPHONE (OUTPATIENT)
Age: 40
End: 2025-06-27

## 2025-06-27 NOTE — TELEPHONE ENCOUNTER
Patient phoned requesting information regarding the breast cancer risk assessment program suggested by her ordering physician. Patient was given information about the breast cancer risk assessment program and stated a team member will contact her to schedule a consultation with Dr. Man.  Scheduled with the genetic counselor to review over family history and talk about breast cancer risk reduction strategies on 9/19. Patient was instructed to call with any further questions or concerns.

## (undated) DEVICE — ENSEAL X1 TISSUE SEALER, CURVED JAW, 25 CM SHAFT LENGTH: Brand: ENSEAL

## (undated) DEVICE — TRAXI PANNICULUS RETRACTOR WITH RETENTUS TECHNOLOGY (BMI 30-50): Brand: TRAXI® PANNICULUS RETRACTOR

## (undated) DEVICE — LARGE, DISPOSABLE ALEXIS O C-SECTION PROTECTOR - RETRACTOR: Brand: ALEXIS ® O C-SECTION PROTECTOR - RETRACTOR

## (undated) DEVICE — Device

## (undated) NOTE — LETTER
[facility]  Boniaubree Cardonaamirah 60 [id number] [facility], [address] [place] [address]    Consent for Operation    Date: __________________    Time: _______________    1.  I authorize the performance upon Colton Axon the following operation:                              Repeat Ce 5. I consent to the photographing or videotaping of the operations or procedures to be performed, including appropriate portions of my body for medical, scientific, or educational purposes, provided my identity is not revealed by the pictures or by descrip 5. My surgeon/physician has discussed the potential benefits, risks, and side effects of this procedure; the likelihood of achieving goals; and potential problems that might occur during recuperation.  They also discussed reasonable alternatives to the proc a. Allow the anesthesiologist (anesthesia doctor) to give me medicine and do additional procedures as necessary.  Some examples are: Starting or using an “IV” to give memedicine, fluids or blood during my procedure, and having a breathing tube placed to hel 7. Regional Anesthesia (“spinal”, “epidural”, & “nerve blocks”): I understand that rare but potential complications include headache, bleeding, infection, seizure, irregular heart rhythms, and nerve injury.     I can change my mind about having anesthesia